# Patient Record
Sex: MALE | Race: WHITE | Employment: FULL TIME | URBAN - METROPOLITAN AREA
[De-identification: names, ages, dates, MRNs, and addresses within clinical notes are randomized per-mention and may not be internally consistent; named-entity substitution may affect disease eponyms.]

---

## 2021-04-19 ENCOUNTER — HOSPITAL ENCOUNTER (EMERGENCY)
Age: 51
Discharge: HOME | End: 2021-04-19
Attending: EMERGENCY MEDICINE

## 2021-04-19 VITALS
OXYGEN SATURATION: 96 % | RESPIRATION RATE: 17 BRPM | DIASTOLIC BLOOD PRESSURE: 89 MMHG | WEIGHT: 237.66 LBS | TEMPERATURE: 97.6 F | SYSTOLIC BLOOD PRESSURE: 144 MMHG | HEART RATE: 95 BPM

## 2021-04-19 DIAGNOSIS — H16.133 UV KERATITIS, BILATERAL: Primary | ICD-10-CM

## 2021-04-19 PROCEDURE — 99284 EMERGENCY DEPT VISIT MOD MDM: CPT

## 2021-04-19 PROCEDURE — 6370000000 HC RX 637 (ALT 250 FOR IP): Performed by: EMERGENCY MEDICINE

## 2021-04-19 RX ORDER — BACITRACIN ZINC AND POLYMYXIN B SULFATE 500; 10000 [USP'U]/G; [USP'U]/G
OINTMENT OPHTHALMIC 3 TIMES DAILY
Status: DISCONTINUED | OUTPATIENT
Start: 2021-04-19 | End: 2021-04-19 | Stop reason: HOSPADM

## 2021-04-19 RX ORDER — BACITRACIN ZINC AND POLYMYXIN B SULFATE 500; 10000 [USP'U]/G; [USP'U]/G
OINTMENT OPHTHALMIC ONCE
Status: COMPLETED | OUTPATIENT
Start: 2021-04-19 | End: 2021-04-19

## 2021-04-19 RX ORDER — TETRACAINE HYDROCHLORIDE 5 MG/ML
1 SOLUTION OPHTHALMIC ONCE
Status: COMPLETED | OUTPATIENT
Start: 2021-04-19 | End: 2021-04-19

## 2021-04-19 RX ORDER — BACITRACIN ZINC AND POLYMYXIN B SULFATE 500; 10000 [USP'U]/G; [USP'U]/G
1 OINTMENT OPHTHALMIC 3 TIMES DAILY
Qty: 1 TUBE | Refills: 0 | Status: SHIPPED | OUTPATIENT
Start: 2021-04-19

## 2021-04-19 RX ORDER — TAMSULOSIN HYDROCHLORIDE 0.4 MG/1
0.4 CAPSULE ORAL DAILY
COMMUNITY
Start: 2021-02-26

## 2021-04-19 RX ORDER — ALLOPURINOL 100 MG/1
100 TABLET ORAL DAILY
COMMUNITY
Start: 2021-02-26

## 2021-04-19 RX ADMIN — TETRACAINE HYDROCHLORIDE 1 DROP: 5 SOLUTION OPHTHALMIC at 04:33

## 2021-04-19 RX ADMIN — BACITRACIN ZINC AND POLYMYXIN B SULFATES: 500; 10000 OINTMENT OPHTHALMIC at 04:53

## 2021-04-19 RX ADMIN — FLUORESCEIN SODIUM 1 MG: 1 STRIP OPHTHALMIC at 04:33

## 2021-04-19 ASSESSMENT — PAIN DESCRIPTION - PAIN TYPE: TYPE: ACUTE PAIN

## 2021-04-19 ASSESSMENT — VISUAL ACUITY
OS: 20/20
OU: 20/25

## 2021-04-19 ASSESSMENT — PAIN SCALES - GENERAL: PAINLEVEL_OUTOF10: 9

## 2021-04-19 NOTE — ED PROVIDER NOTES
[facility] ENCOUNTER      Pt Name: Bisi Smith  MRN: [id number]  [place] 1970  Date of evaluation: 4/19/2021  Provider: Latisha Perry, [address] [place] [address]       Chief Complaint   Patient presents with    Burning Eyes     bilateral eye buring since 0200 am. 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 feels burning on lower eyelids. HISTORY OF PRESENT ILLNESS   (Location/Symptom, Timing/Onset, Context/Setting, Quality, Duration, Modifying Factors, Severity)  Note limiting factors. Bisi Smith is a 44 y.o. male who presents to the emergency department with a complaint of burning and watering from both eyes since he awakened from sleep at 2 AM today. He states that earlier today at around 5 PM he was welding an exhaust pipe underneath the car. He denies any foreign body sensation. He does not wear glasses or contact lenses. He has had similar symptoms in the past with Welders flash. He denies any other complaints. No earache sore throat sinus drainage, fever, chills, cough or cold symptoms. No chest pain or shortness of breath. He denies any direct trauma to the eye. Nursing Notes were reviewed. HPI        REVIEW OF SYSTEMS    (2-9 systems for level 4, 10 or more for level 5)       Constitutional: Negative for fever or chills. HENT: Negative for rhinorrhea and sore throat. Respiratory: Negative for shortness of breath or dyspnea on exertion. Cardiovascular: Negative for chest pain. Gastrointestinal: Negative for abdominal pain. Negative for vomiting or diarrhea. Genitourinary: Negative for flank pain. Negative for dysuria. Negative for hematuria. Neurological: Negative for headache. All systems are reviewed and are negative except for those listed above in the history of present illness and ROS.         PAST MEDICAL HISTORY     Past Medical History: Diagnosis Date    Gout          SURGICAL HISTORY       Past Surgical History:   Procedure Laterality Date    NECK SURGERY           CURRENT MEDICATIONS       Previous Medications    ALLOPURINOL (ZYLOPRIM) 100 MG TABLET    Take 100 mg by mouth daily    TAMSULOSIN (FLOMAX) 0.4 MG CAPSULE    Take 0.4 mg by mouth daily       ALLERGIES     Patient has no known allergies. FAMILY HISTORY     No family history on file. SOCIAL HISTORY       Social History     Socioeconomic History    Marital status: Single     Spouse name: Not on file    Number of children: Not on file    Years of education: Not on file    Highest education level: Not on file   Occupational History    Not on file   Social Needs    Financial resource strain: Not on file    Food insecurity     Worry: Not on file     Inability: Not on file    Transportation needs     Medical: Not on file     Non-medical: Not on file   Tobacco Use    Smoking status: Former Smoker    Smokeless tobacco: Never Used   Substance and Sexual Activity    Alcohol use:  Yes    Drug use: Never    Sexual activity: Not on file   Lifestyle    Physical activity     Days per week: Not on file     Minutes per session: Not on file    Stress: Not on file   Relationships    Social connections     Talks on phone: Not on file     Gets together: Not on file     Attends [employer] service: Not on file     Active member of club or organization: Not on file     Attends meetings of clubs or organizations: Not on file     Relationship status: Not on file    Intimate partner violence     Fear of current or ex partner: Not on file     Emotionally abused: Not on file     Physically abused: Not on file     Forced sexual activity: Not on file   Other Topics Concern    Not on file   Social History Narrative    Not on file       SCREENINGS             PHYSICAL EXAM    (up to 7 for level 4, 8 or more for level 5)     ED Triage Vitals   BP Temp Temp src Pulse Resp SpO2 Height Weight -- -- -- -- -- -- -- --         Physical Exam   Constitutional: Awake and alert. Very pleasant. Mild discomfort. Head: No visible evidence of trauma. Normocephalic. Eyes: Pupils equal and reactive. No photophobia. Sclera white. Conjunctive a pink. Lids were everted. No evidence of foreign body. Lash margins were clear. No periorbital edema or rash. No pain with extraocular movement. Pupils 3 mm bilaterally and reactive. HENT: Oral mucosa moist.  Airway patent. Nares were clear. Heart:  Regular rate and rhythm. Lungs: No conversational dyspnea or accessory muscle use. Musculoskeletal: Extremities non-tender with full range of motion. Neurological: Alert and oriented x 3. Speech clear. Cranial nerves II-XII intact. No facial droop. No acute focal motor or sensory deficits. Skin: Skin is warm and dry. No rash. Psychiatric: Normal mood and affect. Behavior is normal.         DIAGNOSTIC RESULTS     EKG: All EKG's are interpreted by the Emergency Department Physician who either signs or Co-signs this chart in the absence of a cardiologist.        RADIOLOGY:   Non-plain film images such as CT, Ultrasound and MRI are read by the radiologist. Plain radiographic images are visualized and preliminarily interpreted by the emergency physician with the below findings:        Interpretation per the Radiologist below, if available at the time of this note:    No orders to display         ED BEDSIDE ULTRASOUND:   Performed by ED Physician - none    LABS:  Labs Reviewed - No data to display    All other labs were within normal range or not returned as of this dictation.     EMERGENCY DEPARTMENT COURSE and DIFFERENTIAL DIAGNOSIS/MDM:   Vitals:    Vitals:    04/19/21 0414   BP: (!) 144/89   Pulse: 95   Resp: 17   Temp: 97.6 °F (36.4 °C)   TempSrc: Oral   SpO2: 96%   Weight: 237 lb 10.5 oz (107.8 kg)         MDM      The patient presented with burning and watering from both eyes after welding this afternoon. There is no evidence of foreign body. He does have punctate fluorescein uptake on the surface of both corneas diffusely consistent with UV keratitis. He was given Polysporin ophthalmic ointment to both eyes and will be given a prescription. He will be referred to [facility] and was advised to call today for an appointment to be seen in 1 to 2 days. I recommended that he wear dark glasses and stay indoors. He was advised to not drive or operate machinery if any impairment of his vision or while using ophthalmic ointment. If his condition worsens or new symptoms develop, he was advised to return immediately to the emergency department. He was advised to not rub or scratch the eyes. REASSESSMENT              CRITICAL CARE TIME   Total Critical Care time was 0 minutes, excluding separately reportable procedures. There was a high probability of clinically significant/life threatening deterioration in the patient's condition which required my urgent intervention. CONSULTS:  None    PROCEDURES:  Unless otherwise noted below, none     Procedures    Slit-lamp exam:    The patient was given 2 drops of tetracaine 0.5% ophthalmic solution and 2 drops of fluorescein stain to both eyes. He was examined under the slit lamp. Lash margins were clear. Sclera white. Conjunctive a pink. Anterior chambers were clear. There was diffuse punctate fluorescein uptake on the superficial cornea diffusely bilaterally. Findings are consistent with UV keratitis. There is no evidence of foreign body. He was irrigated with normal saline. He tolerated the procedure well. He noted significant improvement with tetracaine.     FINAL IMPRESSION      1. UV keratitis, bilateral          DISPOSITION/PLAN   DISPOSITION        PATIENT REFERRED TO:  [address] [facility] [address] [address] [phone]    Call today        DISCHARGE MEDICATIONS:  New Prescriptions BACITRACIN-POLYMYXIN B, OPHTH, (AK-POLY-CAPO) OINT    Apply 3.5 g to eye 3 times daily     Controlled Substances Monitoring:     No flowsheet data found. (Please note that portions of this note were completed with a voice recognition program.  Efforts were made to edit the dictations but occasionally words are mis-transcribed. )    [id number] Kurt Aly DO (electronically signed)  Attending Emergency Physician          Rue Kocher, DO  04/19/21 9377

## 2021-04-19 NOTE — ED NOTES
Both eyes irrigated with saline syringe using zero wet. Pt tolerated well. Pt able to keep eyes open after procedure, eye watering lessen.  Pt states \"it feels so much better\"     Samantha Zamora RN  04/19/21 0274